# Patient Record
Sex: MALE | Race: BLACK OR AFRICAN AMERICAN
[De-identification: names, ages, dates, MRNs, and addresses within clinical notes are randomized per-mention and may not be internally consistent; named-entity substitution may affect disease eponyms.]

---

## 2019-10-25 ENCOUNTER — HOSPITAL ENCOUNTER (EMERGENCY)
Dept: HOSPITAL 72 - EMR | Age: 24
Discharge: HOME | End: 2019-10-25
Payer: COMMERCIAL

## 2019-10-25 VITALS — DIASTOLIC BLOOD PRESSURE: 95 MMHG | SYSTOLIC BLOOD PRESSURE: 154 MMHG

## 2019-10-25 VITALS — WEIGHT: 205 LBS | HEIGHT: 74 IN | BODY MASS INDEX: 26.31 KG/M2

## 2019-10-25 DIAGNOSIS — J02.0: Primary | ICD-10-CM

## 2019-10-25 DIAGNOSIS — K12.2: ICD-10-CM

## 2019-10-25 PROCEDURE — 96372 THER/PROPH/DIAG INJ SC/IM: CPT

## 2019-10-25 PROCEDURE — 99283 EMERGENCY DEPT VISIT LOW MDM: CPT

## 2019-10-25 NOTE — NUR
ED Nurse Note:



Pt walked in to ED due to sore throat since this mornin with difficulty 
swallowing.

## 2019-10-25 NOTE — EMERGENCY ROOM REPORT
History of Present Illness


General


Chief Complaint:  Sore Throat


Source:  Patient





Present Illness


HPI


24-year-old male presents to the emergency department complaining of 8 out of 

10 severity pain, swelling and foreign body sensation to the throat 1 day.  

Patient denies fevers, but  he reports chills.  Patient reports that his 

tonsils keep intermittently swelling and now another structure in the middle of 

his throat is  swollen and " moves around".  Patient denies cough, headache, 

neck pain/stiffness, nasal congestion or rhinorrhea.  Patient denies recent 

travel or ill contacts.  He denies immune compromise, he denies inability to 

tolerate his own secretions, or muffled/change in voice.  Pain upon swallowing, 

Denies SOB.  No aggravating or relieving factors at this time.


Allergies:  


Coded Allergies:  


     No Known Allergies (Unverified , 10/25/19)





Patient History


Past Medical History:  see triage record


Past Surgical History:  none


Pertinent Family History:  none


Immunizations:  UTD


Reviewed Nursing Documentation:  PMH: Agreed; PSxH: Agreed





Nursing Documentation-PMH


Past Medical History:  No Stated History





Review of Systems


All Other Systems:  negative except mentioned in HPI





Physical Exam





Vital Signs








  Date Time  Temp Pulse Resp B/P (MAP) Pulse Ox O2 Delivery O2 Flow Rate FiO2


 


10/25/19 17:31 98.1 83 18 154/95 (114) 98 Room Air  








Sp02 EP Interpretation:  reviewed, normal


General Appearance:  no apparent distress, alert, GCS 15, non-toxic


Head:  normocephalic, atraumatic


Eyes:  bilateral eye normal inspection, bilateral eye PERRL


ENT:  hearing grossly normal, normal voice


Neck:  full range of motion, no meningismus, no bony tend, other - Swollen 

uvula  that is midline. pharyngeal erythema, tonsillar swelling bilaterally, no 

obvious exudates, no soft palate fullness, or hot potato voice. Pt. able to 

tolerate his own secreations.  No stridor.


Respiratory:  chest non-tender, lungs clear, normal breath sounds, speaking 

full sentences


Cardiovascular #1:  regular rate, rhythm, no edema


Gastrointestinal:  normal bowel sounds, non tender, soft


Rectal:  deferred


Genitourinary:  normal inspection


Musculoskeletal:  back normal, gait/station normal, normal range of motion, non-

tender


Neurologic:  alert, oriented x3, responsive, motor strength/tone normal, 

sensory intact, speech normal, grossly normal


Psychiatric:  judgement/insight normal


Lymphatic:  no adenopathy





Medical Decision Making


PA Attestation


Dr. Fraire Is my supervising Physician whom patient management has been 

discussed with.


Diagnostic Impression:  


 Primary Impression:  


 Pharyngitis, acute


 Qualified Codes:  J02.0 - Streptococcal pharyngitis


 Additional Impression:  


 Uvulitis


ER Course


24-year-old male presents to the emergency department complaining of 8 out of 

10 severity pain, swelling and foreign body sensation to the throat 1 day.  

Patient denies fevers, but  he reports chills.  Patient reports that his 

tonsils keep intermittently swelling and now another structure in the middle of 

his throat is  swollen and " moves around".  Patient denies cough, headache, 

neck pain/stiffness, nasal congestion or rhinorrhea.  Patient denies recent 

travel or ill contacts.  He denies immune compromise, he denies inability to 

tolerate his own secretions, or muffled/change in voice.  Pain upon swallowing, 

Denies SOB.  No aggravating or relieving factors at this time.





Ddx considered but are not limited to: pharyngitis, strep, PTA, ludwigs angina, 

URI 





Vital signs: are WNL, pt. is afebrile 





H&PE are most consistent with: pharyngitis presumed strep. with uvulitis.- no 

airway compromise, no evidence of allergic rxn, no stridor. 





ORDERS: None required at this time as the diagnosis is clinical 





ED INTERVENTIONS: 


-Decadron 8mg IM





DISCHARGE: At this time pt. is stable for d/c to home. Will provide printed 

patient care instructions, and any necessary prescriptions. Care plan and 

follow up instructions have been discussed with the patient prior to discharge.





Last Vital Signs








  Date Time  Temp Pulse Resp B/P (MAP) Pulse Ox O2 Delivery O2 Flow Rate FiO2


 


10/25/19 17:50 98.1 86 18 154/95 98 Room Air  








Disposition:  HOME, SELF-CARE


Condition:  Stable


Scripts


Lidocaine HCl 2% Viscous (Lidocaine HCl 2% Viscous) 100 Ml Solution


15 ML ORAL QID, #200 ML


   Prov: Andreia Leon         10/25/19 


Amoxicillin/Potassium Clav 875-125 Mg Tab* (AMOX TR-K -125 MG TAB*) 1 

Each Tablet


1 TAB ORAL EVERY 12 HOURS for 10 Days, #20 TAB


   Prov: Andreia Leon         10/25/19 


Ibuprofen* (MOTRIN*) 600 Mg Tablet


600 MG ORAL THREE TIMES A DAY, #30 TAB 0 Refills


   Prov: Andreia Leon         10/25/19


Referrals:  


[facility]





[facility] + [facility]


Patient Instructions:  Sore Throat, Uvulitis





Additional Instructions:  


Take medications as directed. 





 ** Follow up with a Primary Care Provider in 3-5 days, even if your symptoms 

have resolved. ** 





--Please review list of primary care clinics, if you do not already have a 

primary care provider





Return sooner to ED if new symptoms occur, or current symptoms become worse. 











- Please note that this Emergency Department Report was dictated using Live[employer] technology software, occasionally this can lead to 

erroneous entry secondary to interpretation by the dictation equipment.











Andreia Leon Oct 25, 2019 18:49

## 2019-10-25 NOTE — NUR
ER DISCHARGE NOTE:



Patient is cleared to be discharged per ERMD, pt is aox4, on room air, with 
stable vital signs. pt was given dc and prescription instructions, pt was able 
to verbalize understanding, pt id band removed without complications. pt is 
able to ambulate with steady gait. pt took all belongings.